# Patient Record
Sex: FEMALE | Race: WHITE | NOT HISPANIC OR LATINO | Employment: FULL TIME | URBAN - METROPOLITAN AREA
[De-identification: names, ages, dates, MRNs, and addresses within clinical notes are randomized per-mention and may not be internally consistent; named-entity substitution may affect disease eponyms.]

---

## 2017-01-16 ENCOUNTER — TELEPHONE (OUTPATIENT)
Dept: GASTROENTEROLOGY | Facility: CLINIC | Age: 27
End: 2017-01-16

## 2017-01-16 NOTE — TELEPHONE ENCOUNTER
Patient called. Dx: Ulcerative Colitis. She is having 10-12 bowel movements a day, severe abdominal cramps, bright red blood, fatigue, warm to the touch. Tried to get patient in with Hattie but she doesn't have any openings for new patient until 02/21/17. I advised patient to go to ER asap or she can see Dr. Humphries on 01/19/17 but she won't get to see Dr. Sanders again; it will be a transfer of care. Patient stated she will call back & 10 mins after she thinks about it first.

## 2017-01-18 ENCOUNTER — OFFICE VISIT (OUTPATIENT)
Dept: GASTROENTEROLOGY | Facility: CLINIC | Age: 27
End: 2017-01-18

## 2017-01-18 VITALS
OXYGEN SATURATION: 99 % | HEART RATE: 68 BPM | DIASTOLIC BLOOD PRESSURE: 70 MMHG | TEMPERATURE: 98.1 F | WEIGHT: 136.6 LBS | HEIGHT: 65 IN | SYSTOLIC BLOOD PRESSURE: 92 MMHG | BODY MASS INDEX: 22.76 KG/M2

## 2017-01-18 DIAGNOSIS — Z80.0 FAMILY HISTORY OF GI MALIGNANCY: ICD-10-CM

## 2017-01-18 DIAGNOSIS — K51.511 ULCERATIVE COLITIS, LEFT SIDED, WITH RECTAL BLEEDING (HCC): Primary | ICD-10-CM

## 2017-01-18 DIAGNOSIS — Z79.60 LONG-TERM USE OF IMMUNOSUPPRESSANT MEDICATION: ICD-10-CM

## 2017-01-18 PROCEDURE — 99214 OFFICE O/P EST MOD 30 MIN: CPT | Performed by: NURSE PRACTITIONER

## 2017-01-18 RX ORDER — MESALAMINE 1.2 G/1
1200 TABLET, DELAYED RELEASE ORAL 4 TIMES DAILY
Qty: 120 TABLET | Refills: 0 | Status: SHIPPED | OUTPATIENT
Start: 2017-01-18 | End: 2017-05-03 | Stop reason: SDUPTHER

## 2017-01-18 RX ORDER — NORGESTIMATE AND ETHINYL ESTRADIOL 7DAYSX3 28
1 KIT ORAL DAILY
COMMUNITY

## 2017-01-18 RX ORDER — MESALAMINE 1.2 G/1
1200 TABLET, DELAYED RELEASE ORAL
COMMUNITY
End: 2017-01-18 | Stop reason: SDUPTHER

## 2017-01-18 NOTE — MR AVS SNAPSHOT
Kamini Jenkins   1/18/2017 10:00 AM   Office Visit    Dept Phone:  [phone]   Encounter #:  [id number]    Provider:  WARREN Fung   Department:  [facility] GASTROENTEROLOGY                Your Full Care Plan              Today's Medication Changes          These changes are accurate as of: 1/18/17 10:52 AM.  If you have any questions, ask your nurse or doctor.               Medication(s)that have changed:     mesalamine 1.2 G EC tablet   Commonly known as:  LIALDA   Take 1 tablet by mouth 4 (Four) Times a Day. 2 tablets daily   What changed:  when to take this   Changed by:  WARREN Fung         Stop taking medication(s)listed here:     Budesonide 3 MG 24 hr capsule   Commonly known as:  ENTOCORT EC   Stopped by:  WARREN Fung           IRON SUPPLEMENT PO   Stopped by:  WARREN Fung                Where to Get Your Medications      These medications were sent to [id number] [place], [place] - [address] [facility] AT [facility] [phone] [facility] [phone]   [id number] [place] [address], [place] [address]     Phone:  [phone]     mesalamine 1.2 G EC tablet                  Your Updated Medication List          This list is accurate as of: 1/18/17 10:52 AM.  Always use your most recent med list.                mesalamine 1.2 G EC tablet   Commonly known as:  LIALDA   Take 1 tablet by mouth 4 (Four) Times a Day. 2 tablets daily       MULTIVITAMIN ADULT PO       TRI-PREVIFEM 0.18/0.215/0.25 MG-35 MCG per tablet   Generic drug:  norgestimate-ethinyl estradiol               We Performed the Following     Clostridium Difficile Toxin, PCR     External [facility] Surgical/Procedural Request     Fecal Lactoferrin     Obtain informed consent       You Were Diagnosed With        Codes Comments    Ulcerative pancolitis with complication    -  Primary ICD-10-CM: K51.019  ICD-9-CM: 556.6        "  Instructions     None    Patient Instructions History      Upcoming Appointments     Visit Type Date Time Department    OFF[facility]IS[facility] 2017 10:00 AM [facility]    OUTSIDE FACILITY 3/15/2017 10:00 AM [facility] [place]      MyChart Signup     [facility] fotobabble allows you to send messages to your doctor, view your test results, renew your prescriptions, schedule appointments, and more. To sign up, go to [employer] and click on the Sign Up Now link in the New User? box. Enter your fotobabble Activation Code exactly as it appears below along with the last four digits of your Social Security Number and your Date of Birth () to complete the sign-up process. If you do not sign up before the expiration date, you must request a new code.    fotobabble Activation Code: [email or web address]  Expires: 2017  5:36 AM    If you have questions, you can email [email or web address] or call [phone] to talk to our fotobabble staff. Remember, fotobabble is NOT to be used for urgent needs. For medical emergencies, dial 911.               Other Info from Your Visit           Your Appointments     Mar 15, 2017 10:00 AM EDT   Outside Facility with Jose Humphries MD   [facility] (--)    [address] [place] [address]. [address] [place] [id number] [phone]              Allergies     No Known Allergies      Reason for Visit     Ulcerative Colitis           Vital Signs     Blood Pressure Pulse Temperature Height    92/70 (BP Location: Left arm, Patient Position: Sitting, Cuff Size: Adult) 68 98.1 °F (36.7 °C) (Temporal Artery ) 65\" (165.1 cm)    Weight Oxygen Saturation Body Mass Index Smoking Status    136 lb 9.6 oz (62 kg) 99% 22.73 kg/m2 Never Smoker      Problems and Diagnoses Noted     Ulcerative pancolitis with complication    -  Primary        "

## 2017-01-18 NOTE — PROGRESS NOTES
ESTABLISHED PATIENT NOTE  Patient: MARY JENKINS : 1990  Date of Service: 2017  CC: Ulcerative Colitis  Assessment/Plan                                             ASSESSMENT & PLANS     Ulcerative colitis, left sided, with bloody stools  -     Clostridium Difficile Toxin, PCR  -     Fecal Lactoferrin  -     Increase mesalamine (LIALDA) fr 2.4 G per day to 4.8 gm per day.  Increase fr 2 tabs to 4 tabs daily (Lialda 1.2 gm tablet).  Pt is to notify us if current sx persist and/or worsen despite medication dose increase.  -     DC Entocort since no sx improvement for the last 2 months.   · DC oral iron because it cause black stools, interfering w/ pt's ability to see if she has bright red bloody stools vs melena and bc it can make a colonoscopy procedure more difficult.  Pt has no anemia  -     Schedule for colonoscopy ASAP    Long-term use of immunosuppressant medication  Family history of GI malignancy (Stomach cancer in her paternal grandmother)    DISCUSSION: The above plan was delineated in details with patient and all questions and concerns were answered.  Patient is also given contact information.  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  Surveillance colonoscopy, done on 14 showed colitis in the universal ulcerative colitis.  Random colon biopsy showed minimal non-specific chronic inflammatory change. Negative for dysplasia.  Lialda was reduced to 2.4 gm daily.      Pt continues to do well until 2016 when she developed abdominal cramping, diarrhea/constipation, fatigue, and joint pain. Pt tried Align probiotic, as Dr Sanders advised, w/o sx improvement.  In 2016,  pt also developed bloody stool. Dr Sanders prescribed Entocort 9 mg daily. Sx persisted. Blood work (w/ CBC-diff, CMP, CRP, iron profile, B12) was unremarkable, with the exception of low serum iron and elevated eosinophils.  She went from having 3-5 bloody BMs per day to 10-12 bloody BMs per day and associated severe abd cramps. Pt presents to clinic today for follow-up. She was last seen by Dr. Sanders on 8/25/14.     She continues to have sx despite Entocort, probiotic, and Lialda (2.4 gm daily). Bloody stools 8-10 times a day, w/ clots.  She has nocturnal BMs, fatigue, unintentional wt loss (4 lbs), rectal pain, abd cramping/bloating, joint swelling, generalized body aches. No fever or night sweat.  Frequent chills, but that is her baseline. Pt has not resumed taking Lialda at a higher dose.  Pt denies anorexia, nausea, vomiting, dysphagia, odynophagia, heartburn, reflux, regurgitation, or early satiety.    ROS:Review of Systems   Constitutional: Positive for appetite change, fatigue (severe) and unexpected weight change (loss). Negative for activity change and fever.   HENT: Negative for hearing loss, trouble swallowing and voice change.    Eyes: Negative for visual disturbance.   Respiratory: Negative for cough, choking, chest tightness and shortness of breath.    Cardiovascular: Negative for chest pain.   Gastrointestinal: Positive for abdominal pain (cramping), blood in stool, diarrhea, nausea and rectal pain. Negative for abdominal distention, anal bleeding, constipation and vomiting.   Endocrine: Negative for polydipsia and polyphagia.   Genitourinary: Negative.    Musculoskeletal: Positive for joint swelling (hands). Negative for gait problem.   Skin: Positive for color change (pale). Negative for rash.   Allergic/Immunologic: Negative for food allergies.   Neurological: Positive for weakness. Negative for dizziness, seizures and speech difficulty.   Hematological: Negative for adenopathy.    Psychiatric/Behavioral: Positive for sleep disturbance. Negative for confusion.     PAST MED HX: Pt  has a past medical history of Seasonal allergies and Ulcerative colitis.  PAST SURG HX: Pt  has a past surgical history that includes Tonsillectomy and Colonoscopy.  FAM HX: family history includes Stomach cancer in her paternal grandmother.  SOC HX: Pt  reports that she has never smoked. She does not have any smokeless tobacco history on file. She reports that she drinks alcohol. She reports that she does not use illicit drugs.  Objective                                                           OBJECTIVE   MEDS: •  Budesonide (ENTOCORT EC) 3 MG 24 hr capsule, Take 3 capsules by mouth Every Morning for 90 days., Disp: 90 capsule, Rfl: 2  •  Ferrous Sulfate (IRON SUPPLEMENT PO), Take 1 tablet by mouth Daily., Disp: , Rfl:   •  mesalamine (LIALDA) 1.2 G EC tablet, Take 1,200 mg by mouth. 2 tablets daily, Disp: , Rfl:   •  Multiple Vitamins-Minerals (MULTIVITAMIN ADULT PO), Take 1 tablet by mouth Daily., Disp: , Rfl:   •  norgestimate-ethinyl estradiol (TRI-PREVIFEM) 0.18/0.215/0.25 MG-35 MCG per tablet, Take 1 tablet by mouth Daily., Disp: , Rfl:     WBC   Date Value Ref Range Status   12/08/2016 6.03 3.50 - 10.80 10*3/mm3 Final   08/12/2015 7.11 3.50 - 10.80 K/mcL Final     RBC   Date Value Ref Range Status   12/08/2016 5.12 3.89 - 5.14 10*6/mm3 Final   08/12/2015 4.63 3.89 - 5.14 M/mcL Final     HEMOGLOBIN   Date Value Ref Range Status   12/08/2016 12.5 11.5 - 15.5 g/dL Final   08/12/2015 12.4 11.5 - 15.5 g/dL Final     HEMATOCRIT   Date Value Ref Range Status   12/08/2016 41.4 34.5 - 44.0 % Final   08/12/2015 38.8 34.5 - 44.0 % Final     MCV   Date Value Ref Range Status   12/08/2016 80.9 80.0 - 99.0 fL Final   08/12/2015 83.8 80.0 - 99.0 fL Final     MCH   Date Value Ref Range Status   12/08/2016 24.4 (L) 27.0 - 31.0 pg Final   08/12/2015 26.8 (L) 27.0 - 31.0 pg Final     [facility]   Date Value Ref Range Status    12/08/2016 30.2 (L) 32.0 - 36.0 g/dL Final   08/12/2015 32.0 32.0 - 36.0 g/dL Final     RDW   Date Value Ref Range Status   12/08/2016 15.0 (H) 11.3 - 14.5 % Final     RDW-SD   Date Value Ref Range Status   12/08/2016 44.3 37.0 - 54.0 fl Final     MPV   Date Value Ref Range Status   12/08/2016 10.4 6.0 - 12.0 fL Final     PLATELETS   Date Value Ref Range Status   12/08/2016 254 150 - 450 10*3/mm3 Final   08/12/2015 207 150 - 450 K/mcL Final     NEUTROPHIL %   Date Value Ref Range Status   12/08/2016 40.9 (L) 41.0 - 71.0 % Final     NEUTROPHIL REL %   Date Value Ref Range Status   08/12/2015 64.5 41.0 - 71.0 % Final     LYMPHOCYTE %   Date Value Ref Range Status   12/08/2016 41.0 24.0 - 44.0 % Final     LYMPHOCYTE REL %   Date Value Ref Range Status   08/12/2015 24.1 24.0 - 44.0 % Final     MONOCYTE %   Date Value Ref Range Status   12/08/2016 9.6 0.0 - 12.0 % Final     MONOCYTE REL %   Date Value Ref Range Status   08/12/2015 7.5 0.0 - 12.0 % Final     EOSINOPHIL %   Date Value Ref Range Status   12/08/2016 7.8 (H) 0.0 - 3.0 % Final     EOSINOPHIL REL %   Date Value Ref Range Status   08/12/2015 3.4 (H) 0.0 - 3.0 % Final     BASOPHIL %   Date Value Ref Range Status   12/08/2016 0.5 0.0 - 1.0 % Final     BASOPHIL REL %   Date Value Ref Range Status   08/12/2015 0.4 0.0 - 1.0 % Final     IMMATURE GRANS %   Date Value Ref Range Status   12/08/2016 0.2 0.0 - 0.6 % Final     NEUTROPHILS, ABSOLUTE   Date Value Ref Range Status   12/08/2016 2.47 1.50 - 8.30 10*3/mm3 Final     NEUTROPHILS ABSOLUTE   Date Value Ref Range Status   08/12/2015 4.59 1.50 - 8.30 K/mcL Final     LYMPHOCYTES, ABSOLUTE   Date Value Ref Range Status   12/08/2016 2.47 0.60 - 4.80 10*3/mm3 Final     LYMPHOCYTES ABSOLUTE   Date Value Ref Range Status   08/12/2015 1.71 0.60 - 4.80 K/mcL Final     MONOCYTES, ABSOLUTE   Date Value Ref Range Status   12/08/2016 0.58 0.00 - 1.00 10*3/mm3 Final     MONOCYTES ABSOLUTE   Date Value Ref Range Status    08/12/2015 0.53 0.00 - 1.00 K/mcL Final     EOSINOPHILS, ABSOLUTE   Date Value Ref Range Status   12/08/2016 0.47 (H) 0.10 - 0.30 10*3/mm3 Final     EOSINOPHILS ABSOLUTE   Date Value Ref Range Status   08/12/2015 0.24 0.10 - 0.30 K/mcL Final     BASOPHILS, ABSOLUTE   Date Value Ref Range Status   12/08/2016 0.03 0.00 - 0.20 10*3/mm3 Final     BASOPHILS ABSOLUTE   Date Value Ref Range Status   08/12/2015 0.03 0.00 - 0.20 K/mcL Final     IMMATURE GRANS, ABSOLUTE   Date Value Ref Range Status   12/08/2016 0.01 0.00 - 0.03 10*3/mm3 Final     NRBC   Date Value Ref Range Status   08/12/2015 0.0  Final     Lab Results   Component Value Date    IRON 24 (L) 12/08/2016    TIBC 550 (H) 12/08/2016    LABIRON 4 (L) 12/08/2016     Lab Results   Component Value Date    AST 21 12/08/2016    ALT 11 12/08/2016    ALKPHOS 53 12/08/2016    BILITOT 0.4 12/08/2016    ALBUMIN 4.30 12/08/2016     Lab Results   Component Value Date     12/08/2016    K 4.0 12/08/2016     12/08/2016    CO2 29.0 12/08/2016    BUN 11 12/08/2016    CREATININE 0.80 12/08/2016    GLUCOSE 86 12/08/2016    CALCIUM 9.5 12/08/2016    ANIONGAP 3.0 12/08/2016     Lab Results   Component Value Date    CRP 0.90 12/08/2016     Lab Results   Component Value Date    HBLBAKYT99 275 12/08/2016       Wt Readings from Last 3 Encounters:   01/18/17 136 lb 9.6 oz (62 kg)   body mass index is 22.73 kg/(m^2).,Temp: 98.1 °F (36.7 °C),BP: 92/70,Heart Rate: 68   Physical Exam  General Well developed; well nourished; no acute distress.   ENT Good dentition.  Oral mucosa pink & moist without thrush or lesions.    Neck Neck supple; trachea midline. No thyromegaly   Resp CTA; no rhonchi, rales, or wheezes.  Respiration effort normal  CV RRR; normal S1, S2; no M/R/G. No lower extremity edema  GI Abd soft, Moderate TTP in lower quadrants, No rebound, no guarding, ND, normal active bowel sounds.  No abd hernia  Skin No rash; no lesions; no bruises.  Skin turgor normal  Musc No  clubbing; no cyanosis.  Gait steady  Psych Oriented to time, place, and person.  Appropriate affect  Thank you kindly for allowing me to be part of this patient’s care.    Pt care team: Hattie KELLEY & Wilbert Doyle [facility]--Gastroenterology  [phone]    CC:  Known Provider  [facility] [address] FAX:None

## 2017-01-20 ENCOUNTER — APPOINTMENT (OUTPATIENT)
Dept: LAB | Facility: HOSPITAL | Age: 27
End: 2017-01-20

## 2017-01-20 LAB
C DIFF TOX GENS STL QL NAA+PROBE: NEGATIVE
LACTOFERRIN STL QL LA: POSITIVE

## 2017-01-20 PROCEDURE — 87493 C DIFF AMPLIFIED PROBE: CPT | Performed by: NURSE PRACTITIONER

## 2017-01-20 PROCEDURE — 83631 LACTOFERRIN FECAL (QUANT): CPT | Performed by: NURSE PRACTITIONER

## 2017-01-20 NOTE — PROGRESS NOTES
Pls let pt know that stool studies are negative for infection, but likely to be related to flare of ulcerative colitis as suspected

## 2017-01-24 ENCOUNTER — TELEPHONE (OUTPATIENT)
Dept: GASTROENTEROLOGY | Facility: CLINIC | Age: 27
End: 2017-01-24

## 2017-01-24 NOTE — TELEPHONE ENCOUNTER
----- Message from WARREN Fung sent at 1/20/2017  1:31 PM EST -----  Pls let pt know that stool studies are negative for infection, but likely to be related to flare of ulcerative colitis as suspected

## 2017-02-08 ENCOUNTER — OUTSIDE FACILITY SERVICE (OUTPATIENT)
Dept: GASTROENTEROLOGY | Facility: CLINIC | Age: 27
End: 2017-02-08

## 2017-02-08 ENCOUNTER — LAB REQUISITION (OUTPATIENT)
Dept: LAB | Facility: HOSPITAL | Age: 27
End: 2017-02-08

## 2017-02-08 DIAGNOSIS — K51.80 OTHER ULCERATIVE COLITIS WITHOUT COMPLICATIONS (HCC): ICD-10-CM

## 2017-02-08 PROCEDURE — 88305 TISSUE EXAM BY PATHOLOGIST: CPT | Performed by: INTERNAL MEDICINE

## 2017-02-08 PROCEDURE — 45380 COLONOSCOPY AND BIOPSY: CPT | Performed by: INTERNAL MEDICINE

## 2017-02-08 PROCEDURE — 88313 SPECIAL STAINS GROUP 2: CPT | Performed by: INTERNAL MEDICINE

## 2017-02-09 LAB
CYTO UR: NORMAL
LAB AP CASE REPORT: NORMAL
LAB AP CLINICAL INFORMATION: NORMAL
Lab: NORMAL
PATH REPORT.FINAL DX SPEC: NORMAL
PATH REPORT.GROSS SPEC: NORMAL

## 2017-03-22 ENCOUNTER — TELEPHONE (OUTPATIENT)
Dept: GASTROENTEROLOGY | Facility: CLINIC | Age: 27
End: 2017-03-22

## 2017-03-22 NOTE — TELEPHONE ENCOUNTER
----- Message from Bon Sanders MD sent at 3/22/2017  2:38 PM EDT -----  Regarding: RE: Patient Call  Contact: [phone]  Excellent.  No followup needed at this time.  MEMO  ----- Message -----     From: Nara Muñoz RN     Sent: 3/22/2017  12:59 PM       To: Bon Sanders MD  Subject: Patient Call                                     Dr. Sanders     Patient called stating you had wanted her to follow up with a phone call following her colonoscopy on 02/08/17. Patient reports she is having no symptoms at this time. I told her I would tell you and if needed you would call her back.     Thanks,   Nara

## 2017-05-03 ENCOUNTER — TELEPHONE (OUTPATIENT)
Dept: GASTROENTEROLOGY | Facility: CLINIC | Age: 27
End: 2017-05-03

## 2017-05-03 DIAGNOSIS — K51.511 ULCERATIVE COLITIS, LEFT SIDED, WITH RECTAL BLEEDING (HCC): ICD-10-CM

## 2017-05-03 RX ORDER — MESALAMINE 1.2 G/1
1200 TABLET, DELAYED RELEASE ORAL 4 TIMES DAILY
Qty: 120 TABLET | Refills: 11 | Status: SHIPPED | OUTPATIENT
Start: 2017-05-03

## 2017-07-17 ENCOUNTER — TELEPHONE (OUTPATIENT)
Dept: GASTROENTEROLOGY | Facility: CLINIC | Age: 27
End: 2017-07-17

## 2017-07-17 NOTE — TELEPHONE ENCOUNTER
Called patient. No answer; left voice message. Lialda 1.2g x 4 tablets daily was denied by her insurance. Medication is not on the preferred drug list.

## 2017-09-27 ENCOUNTER — APPOINTMENT (OUTPATIENT)
Dept: LAB | Facility: HOSPITAL | Age: 27
End: 2017-09-27

## 2017-09-27 ENCOUNTER — OFFICE VISIT (OUTPATIENT)
Dept: GASTROENTEROLOGY | Facility: CLINIC | Age: 27
End: 2017-09-27

## 2017-09-27 VITALS
HEART RATE: 72 BPM | SYSTOLIC BLOOD PRESSURE: 110 MMHG | OXYGEN SATURATION: 99 % | TEMPERATURE: 98.2 F | WEIGHT: 140 LBS | BODY MASS INDEX: 23.32 KG/M2 | RESPIRATION RATE: 12 BRPM | HEIGHT: 65 IN | DIASTOLIC BLOOD PRESSURE: 78 MMHG

## 2017-09-27 DIAGNOSIS — D17.1 LIPOMA OF FLANK: ICD-10-CM

## 2017-09-27 DIAGNOSIS — Z79.899 MEDICATION MANAGEMENT: ICD-10-CM

## 2017-09-27 DIAGNOSIS — K51.00 ULCERATIVE PANCOLITIS WITHOUT COMPLICATION (HCC): Primary | ICD-10-CM

## 2017-09-27 LAB
ALBUMIN SERPL-MCNC: 4.5 G/DL (ref 3.2–4.8)
ALBUMIN/GLOB SERPL: 1.8 G/DL (ref 1.5–2.5)
ALP SERPL-CCNC: 59 U/L (ref 25–100)
ALT SERPL W P-5'-P-CCNC: 17 U/L (ref 7–40)
ANION GAP SERPL CALCULATED.3IONS-SCNC: 7 MMOL/L (ref 3–11)
AST SERPL-CCNC: 25 U/L (ref 0–33)
BASOPHILS # BLD AUTO: 0.02 10*3/MM3 (ref 0–0.2)
BASOPHILS NFR BLD AUTO: 0.5 % (ref 0–1)
BILIRUB SERPL-MCNC: 0.6 MG/DL (ref 0.3–1.2)
BUN BLD-MCNC: 9 MG/DL (ref 9–23)
BUN/CREAT SERPL: 11.3 (ref 7–25)
CALCIUM SPEC-SCNC: 9.2 MG/DL (ref 8.7–10.4)
CHLORIDE SERPL-SCNC: 104 MMOL/L (ref 99–109)
CO2 SERPL-SCNC: 28 MMOL/L (ref 20–31)
CREAT BLD-MCNC: 0.8 MG/DL (ref 0.6–1.3)
DEPRECATED RDW RBC AUTO: 47.3 FL (ref 37–54)
EOSINOPHIL # BLD AUTO: 0.08 10*3/MM3 (ref 0–0.3)
EOSINOPHIL NFR BLD AUTO: 2.2 % (ref 0–3)
ERYTHROCYTE [DISTWIDTH] IN BLOOD BY AUTOMATED COUNT: 17.1 % (ref 11.3–14.5)
GFR SERPL CREATININE-BSD FRML MDRD: 87 ML/MIN/1.73
GLOBULIN UR ELPH-MCNC: 2.5 GM/DL
GLUCOSE BLD-MCNC: 81 MG/DL (ref 70–100)
HCT VFR BLD AUTO: 39.1 % (ref 34.5–44)
HGB BLD-MCNC: 12.1 G/DL (ref 11.5–15.5)
IMM GRANULOCYTES # BLD: 0 10*3/MM3 (ref 0–0.03)
IMM GRANULOCYTES NFR BLD: 0 % (ref 0–0.6)
LYMPHOCYTES # BLD AUTO: 1.52 10*3/MM3 (ref 0.6–4.8)
LYMPHOCYTES NFR BLD AUTO: 41.3 % (ref 24–44)
MCH RBC QN AUTO: 24 PG (ref 27–31)
MCHC RBC AUTO-ENTMCNC: 30.9 G/DL (ref 32–36)
MCV RBC AUTO: 77.4 FL (ref 80–99)
MONOCYTES # BLD AUTO: 0.21 10*3/MM3 (ref 0–1)
MONOCYTES NFR BLD AUTO: 5.7 % (ref 0–12)
NEUTROPHILS # BLD AUTO: 1.85 10*3/MM3 (ref 1.5–8.3)
NEUTROPHILS NFR BLD AUTO: 50.3 % (ref 41–71)
PLATELET # BLD AUTO: 218 10*3/MM3 (ref 150–450)
PMV BLD AUTO: 10.3 FL (ref 6–12)
POTASSIUM BLD-SCNC: 3.7 MMOL/L (ref 3.5–5.5)
PROT SERPL-MCNC: 7 G/DL (ref 5.7–8.2)
RBC # BLD AUTO: 5.05 10*6/MM3 (ref 3.89–5.14)
SODIUM BLD-SCNC: 139 MMOL/L (ref 132–146)
WBC NRBC COR # BLD: 3.68 10*3/MM3 (ref 3.5–10.8)

## 2017-09-27 PROCEDURE — 36415 COLL VENOUS BLD VENIPUNCTURE: CPT | Performed by: INTERNAL MEDICINE

## 2017-09-27 PROCEDURE — 80053 COMPREHEN METABOLIC PANEL: CPT | Performed by: INTERNAL MEDICINE

## 2017-09-27 PROCEDURE — 99213 OFFICE O/P EST LOW 20 MIN: CPT | Performed by: INTERNAL MEDICINE

## 2017-09-27 PROCEDURE — 85025 COMPLETE CBC W/AUTO DIFF WBC: CPT | Performed by: INTERNAL MEDICINE

## 2017-09-27 NOTE — PROGRESS NOTES
"[facility] Gastroenterology   History & Physical    Chief Complaint   Patient presents with   • Ulcerative Colitis         Subjective Flare of ulcerative colitis in early 2017.      HPISince increasing her Lialda to 2.4gm bid, her flare has subsided. Having 1-2 bm's a day that are formed .  May have loose stool once a week.  No abdominal pain, no bleeding. Appetite and energy are good. Has no extraintestinal symptoms of skin ,joint or eyes.  Last colonoscopy in Feb 2017      Past Medical History:   Diagnosis Date   • Seasonal allergies    • Ulcerative colitis          Family History   Problem Relation Age of Onset   • Stomach cancer Paternal Grandmother           reports that she has never smoked. She does not have any smokeless tobacco history on file. She reports that she drinks alcohol. She reports that she does not use illicit drugs.        Current Outpatient Prescriptions:   •  mesalamine (LIALDA) 1.2 G EC tablet, Take 1 tablet by mouth 4 (Four) Times a Day., Disp: 120 tablet, Rfl: 11  •  Multiple Vitamins-Minerals (MULTIVITAMIN ADULT PO), Take 1 tablet by mouth Daily., Disp: , Rfl:   •  norgestimate-ethinyl estradiol (TRI-PREVIFEM) 0.18/0.215/0.25 MG-35 MCG per tablet, Take 1 tablet by mouth Daily., Disp: , Rfl:     Allergies:  Review of patient's allergies indicates no known allergies.    ROS:    Review of Systems   Constitution: Negative.   HENT: Negative.    Eyes: Negative.    Cardiovascular: Negative.    Respiratory: Negative.    Hematologic/Lymphatic: Negative.    Skin: Negative.    Musculoskeletal: Negative.    Gastrointestinal: Negative.    Genitourinary: Negative.    Neurological: Negative.    Psychiatric/Behavioral: Negative.        Objective     Blood pressure 110/78, pulse 72, temperature 98.2 °F (36.8 °C), temperature source Temporal Artery , resp. rate 12, height 65\" (165.1 cm), weight 140 lb (63.5 kg), SpO2 99 %.    Physical Exam   Constitutional: Pt is oriented to person, " place, and time and well-developed, well-nourished, and in no distress.   HENT:   Mouth/Throat: Oropharynx is clear and moist.   Neck: Normal range of motion. Neck supple.   Cardiovascular: Normal rate, regular rhythm and normal heart sounds.    Pulmonary/Chest: Effort normal and breath sounds normal. No respiratory distress. No  wheezes.   Abdominal: Soft. Bowel sounds are normal.  Soft, non-tender, normal bowel sounds; no bruits, organomegaly or masses. She does have a 3 cm lipoma on right flank  Skin: Skin is warm and dry.   Psychiatric: Mood, memory, affect and judgment normal.     Assessment/Plan  She has been in remission for 6 months on 4.8 gms of Lialda, I will ask her to reduce dose to 2.4 gm once daily on her Lialda.  Labs today.      Diagnosis:  Kamini was seen today for ulcerative colitis.    Diagnoses and all orders for this visit:    Ulcerative pancolitis without complication  -     CBC & Differential  -     Comprehensive Metabolic Panel    Medication management  -     CBC & Differential  -     Comprehensive Metabolic Panel    Lipoma of flank        Anticipated Surgical Procedure:    The risks, benefits, and alternatives of this procedure have been discussed with the patient or the responsible party- the patient understands and agrees to proceed.